# Patient Record
Sex: MALE | URBAN - METROPOLITAN AREA
[De-identification: names, ages, dates, MRNs, and addresses within clinical notes are randomized per-mention and may not be internally consistent; named-entity substitution may affect disease eponyms.]

---

## 2021-03-25 ENCOUNTER — OFFICE VISIT (OUTPATIENT)
Dept: URBAN - METROPOLITAN AREA CLINIC 91 | Facility: CLINIC | Age: 51
End: 2021-03-25
Payer: MEDICAID

## 2021-03-25 DIAGNOSIS — H18.512 ENDOTHELIAL CORNEAL DYSTROPHY, LEFT EYE: ICD-10-CM

## 2021-03-25 DIAGNOSIS — H54.414A BLINDNESS RIGHT EYE CATEGORY 4, NORMAL VISION LEFT EYE: ICD-10-CM

## 2021-03-25 DIAGNOSIS — H40.1123 PRIMARY OPEN-ANGLE GLAUCOMA OF LEFT EYE, SEVERE STAGE: Primary | ICD-10-CM

## 2021-03-25 DIAGNOSIS — H17.11 CENTRAL CORNEAL OPACITY, RIGHT EYE: ICD-10-CM

## 2021-03-25 PROCEDURE — 92014 COMPRE OPH EXAM EST PT 1/>: CPT | Performed by: OPHTHALMOLOGY

## 2021-03-25 PROCEDURE — 92083 EXTENDED VISUAL FIELD XM: CPT | Performed by: OPHTHALMOLOGY

## 2021-03-25 RX ORDER — ATORVASTATIN CALCIUM 20 MG/1
20 MG TABLET, FILM COATED ORAL
Refills: 0 | Status: ACTIVE
Start: 2021-03-25

## 2021-03-25 RX ORDER — METFORMIN HYDROCHLORIDE 500 MG/1
500 MG TABLET, FILM COATED ORAL
Refills: 0 | Status: ACTIVE
Start: 2021-03-25

## 2021-03-25 ASSESSMENT — INTRAOCULAR PRESSURE: OS: 16

## 2021-03-25 NOTE — IMPRESSION/PLAN
Impression: Central corneal opacity, right eye: H17.11. Plan: Discussed Corneal scar OD with patient.

## 2021-03-25 NOTE — IMPRESSION/PLAN
Impression: Primary open-angle glaucoma of left eye, severe stage: J26.0913. Plan: IOP is stable on current drop regimen, although low teens would be ideal.  Continue  Robbie 1% BID+Latanoprost QHS +Brimonidine BID OS. Timolol BID OS. Patient would like referal to Dr Chrissy Schneider for further options on improving IOP OS given his monocular status. ! 0-2 VF likely stable OS. I have educated the patient on glaucoma and  stressed importance of patient compliance with follow-up appointments and using glaucoma drops as directed. I have explained the risk of irreversible vision loss and possible blindness associated with glaucoma. Will continue to monitor patient's glaucoma status with periodic OCTg, visual field testing and fundus photography.

## 2021-03-25 NOTE — IMPRESSION/PLAN
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 Plan: The patient was diagnosed with Fuch's dystrophy with little to no corneal edema at this time. The patient's vision and the ability to perform ADL's are adequate to meet their needs. We will monitor the corneas with periodic testing to include pachymetry and ECC.   Freddie 128 qtts qid and ointment qhs were recommended

## 2021-07-30 ENCOUNTER — OFFICE VISIT (OUTPATIENT)
Dept: URBAN - METROPOLITAN AREA CLINIC 91 | Facility: CLINIC | Age: 51
End: 2021-07-30
Payer: MEDICAID

## 2021-07-30 PROCEDURE — 99213 OFFICE O/P EST LOW 20 MIN: CPT | Performed by: OPHTHALMOLOGY

## 2021-07-30 PROCEDURE — 92133 CPTRZD OPH DX IMG PST SGM ON: CPT | Performed by: OPHTHALMOLOGY

## 2021-07-30 ASSESSMENT — INTRAOCULAR PRESSURE: OS: 16

## 2021-07-30 NOTE — IMPRESSION/PLAN
Impression: Primary open-angle glaucoma of left eye, severe stage: L81.9509. Plan: Patient following up with Dr Kenneth Serrano glaucoma specialist, he is not recommending laser sx at this time,  He prefers to adjust medication given high risk of vision loss with surgical procedures in his case. Continue  Robbie 1% BID+Latanoprost QHS +Brimonidine BID OS. and dorz-jasiel bid OS.  Per patient has schedule appt in 2 weeks, patient to continue follow ups with specialist.

## 2022-05-12 ENCOUNTER — OFFICE VISIT (OUTPATIENT)
Dept: URBAN - METROPOLITAN AREA CLINIC 91 | Facility: CLINIC | Age: 52
End: 2022-05-12
Payer: MEDICAID

## 2022-05-12 DIAGNOSIS — H40.1123 PRIMARY OPEN-ANGLE GLAUCOMA OF LEFT EYE, SEVERE STAGE: Primary | ICD-10-CM

## 2022-05-12 PROCEDURE — 99213 OFFICE O/P EST LOW 20 MIN: CPT | Performed by: OPHTHALMOLOGY

## 2022-05-12 ASSESSMENT — INTRAOCULAR PRESSURE: OS: 16

## 2022-05-12 NOTE — IMPRESSION/PLAN
Impression: Primary open-angle glaucoma of left eye, severe stage: L93.0385. Plan: IOP is well controlled on current drop regimen. Continue Rocklatan OS QHS, Robbie OS BIS, Latanoprost OS QHS, and Cospot OS BID. I have educated the patient on glaucoma and  stressed importance of patient compliance with follow-up appointments and using glaucoma drops as directed. I have explained the risk of irreversible vision loss and possible blindness associated with glaucoma. Will continue to monitor patient's glaucoma status with periodic OCTg, visual field testing and fundus photography.

## 2023-01-25 ENCOUNTER — OFFICE VISIT (OUTPATIENT)
Dept: URBAN - METROPOLITAN AREA CLINIC 91 | Facility: CLINIC | Age: 53
End: 2023-01-25
Payer: MEDICAID

## 2023-01-25 DIAGNOSIS — H54.414A BLINDNESS RIGHT EYE CATEGORY 4, NORMAL VISION LEFT EYE: ICD-10-CM

## 2023-01-25 DIAGNOSIS — H40.1123 PRIMARY OPEN-ANGLE GLAUCOMA OF LEFT EYE, SEVERE STAGE: Primary | ICD-10-CM

## 2023-01-25 PROCEDURE — 99213 OFFICE O/P EST LOW 20 MIN: CPT | Performed by: OPHTHALMOLOGY

## 2023-01-25 ASSESSMENT — INTRAOCULAR PRESSURE: OS: 16

## 2023-01-25 NOTE — IMPRESSION/PLAN
Impression: Blindness right eye category 4, normal vision left eye: H54.414A.  Plan: no pain or discomfort, monitor

## 2023-01-25 NOTE — IMPRESSION/PLAN
Impression: Primary open-angle glaucoma of left eye, severe stage: L14.7574. Plan: IOP is well controlled on current drop regimen. Continue Rocklatan OS QHS, Robbie OS BID, brimonidine bid OS, and Cosopt OS BID. Patient continues care with Dr Katherine Montgomery. I have educated the patient on glaucoma and  stressed importance of patient compliance with follow-up appointments and using glaucoma drops as directed. I have explained the risk of irreversible vision loss and possible blindness associated with glaucoma. Will continue to monitor patient's glaucoma status with periodic OCTg, visual field testing and fundus photography.

## 2024-03-06 ENCOUNTER — OFFICE VISIT (OUTPATIENT)
Dept: URBAN - METROPOLITAN AREA CLINIC 91 | Facility: CLINIC | Age: 54
End: 2024-03-06
Payer: MEDICAID

## 2024-03-06 DIAGNOSIS — H54.414A BLINDNESS RIGHT EYE CATEGORY 4, NORMAL VISION LEFT EYE: ICD-10-CM

## 2024-03-06 DIAGNOSIS — H40.1123 PRIMARY OPEN-ANGLE GLAUCOMA OF LEFT EYE, SEVERE STAGE: Primary | ICD-10-CM

## 2024-03-06 PROCEDURE — 99213 OFFICE O/P EST LOW 20 MIN: CPT | Performed by: OPHTHALMOLOGY

## 2024-03-06 ASSESSMENT — INTRAOCULAR PRESSURE: OS: 16
